# Patient Record
Sex: MALE | HISPANIC OR LATINO | Employment: UNEMPLOYED | URBAN - NONMETROPOLITAN AREA
[De-identification: names, ages, dates, MRNs, and addresses within clinical notes are randomized per-mention and may not be internally consistent; named-entity substitution may affect disease eponyms.]

---

## 2022-10-24 RX ORDER — LISINOPRIL 2.5 MG/1
2.5 TABLET ORAL DAILY
COMMUNITY

## 2022-10-24 RX ORDER — ATORVASTATIN CALCIUM 20 MG/1
20 TABLET, FILM COATED ORAL DAILY
COMMUNITY

## 2025-01-09 ENCOUNTER — CONSULT (OUTPATIENT)
Dept: GASTROENTEROLOGY | Facility: CLINIC | Age: 70
End: 2025-01-09
Payer: COMMERCIAL

## 2025-01-09 VITALS
BODY MASS INDEX: 25.16 KG/M2 | HEART RATE: 62 BPM | SYSTOLIC BLOOD PRESSURE: 118 MMHG | TEMPERATURE: 98.3 F | DIASTOLIC BLOOD PRESSURE: 72 MMHG | HEIGHT: 65 IN | WEIGHT: 151 LBS | OXYGEN SATURATION: 98 %

## 2025-01-09 DIAGNOSIS — K21.9 GASTROESOPHAGEAL REFLUX DISEASE, UNSPECIFIED WHETHER ESOPHAGITIS PRESENT: ICD-10-CM

## 2025-01-09 DIAGNOSIS — K62.89 RECTAL PAIN: ICD-10-CM

## 2025-01-09 DIAGNOSIS — K62.5 RECTAL BLEEDING: Primary | ICD-10-CM

## 2025-01-09 DIAGNOSIS — R10.13 EPIGASTRIC PAIN: ICD-10-CM

## 2025-01-09 DIAGNOSIS — K59.04 CHRONIC IDIOPATHIC CONSTIPATION: ICD-10-CM

## 2025-01-09 DIAGNOSIS — K64.9 HEMORRHOIDS, UNSPECIFIED HEMORRHOID TYPE: ICD-10-CM

## 2025-01-09 PROCEDURE — 99204 OFFICE O/P NEW MOD 45 MIN: CPT | Performed by: NURSE PRACTITIONER

## 2025-01-09 RX ORDER — SODIUM CHLORIDE, SODIUM LACTATE, POTASSIUM CHLORIDE, CALCIUM CHLORIDE 600; 310; 30; 20 MG/100ML; MG/100ML; MG/100ML; MG/100ML
125 INJECTION, SOLUTION INTRAVENOUS CONTINUOUS
OUTPATIENT
Start: 2025-01-09

## 2025-01-09 RX ORDER — POLYETHYLENE GLYCOL 3350, SODIUM SULFATE ANHYDROUS, SODIUM BICARBONATE, SODIUM CHLORIDE, POTASSIUM CHLORIDE 236; 22.74; 6.74; 5.86; 2.97 G/4L; G/4L; G/4L; G/4L; G/4L
4000 POWDER, FOR SOLUTION ORAL ONCE
Qty: 4000 ML | Refills: 0 | Status: SHIPPED | OUTPATIENT
Start: 2025-01-09 | End: 2025-01-09

## 2025-01-09 RX ORDER — HYDROCORTISONE ACETATE 25 MG/1
25 SUPPOSITORY RECTAL 2 TIMES DAILY PRN
Qty: 30 SUPPOSITORY | Refills: 3 | Status: SHIPPED | OUTPATIENT
Start: 2025-01-09

## 2025-01-09 RX ORDER — FAMOTIDINE 20 MG/1
20 TABLET, FILM COATED ORAL 2 TIMES DAILY
Qty: 60 TABLET | Refills: 3 | Status: SHIPPED | OUTPATIENT
Start: 2025-01-09

## 2025-01-09 RX ORDER — POLYETHYLENE GLYCOL 3350 17 G/17G
17 POWDER, FOR SOLUTION ORAL DAILY
Qty: 578 G | Refills: 3 | Status: SHIPPED | OUTPATIENT
Start: 2025-01-09

## 2025-01-09 NOTE — PATIENT INSTRUCTIONS
Proceed with EGD/Colonoscopy.   Can use Anusol suppository up to 2 x daily as needed.   Start Famotidine 20 mg, , 1 pill, 2 x day prior to a meal.   Start Miralax daily.   Keep drinking at least 64 oz of water daily.   Continue to watch for red flag symptoms.   Schedule a f/u OV after EGD/Colonoscopy.     We did review GI red flag symptoms, including, but not limited to: chronic nausea, vomiting, diarrhea, chills, fever, and unintentional weight loss and should call or contact our office with any changes or concerns. I reviewed with the patient that if they notice any blood while vomiting or in their stool they should contact or office or go to the nearest emergency room for immediate evaluation. The patient was agreeable and verbalized an understanding.

## 2025-01-09 NOTE — PROGRESS NOTES
Name: Jenny Tello      : 1955      MRN: [id number]  Encounter Provider: KEI Lucio  Encounter Date: 2025   Encounter department: [facility] [place]  :  Assessment & Plan  Rectal bleeding  While the patient and his daughter were in the office today, I discussed with them that at this point time with his frequent, but intermittent rectal bleeding, pain, hemorrhoids, and history of chronic idiopathic constipation, I do feel that it would be appropriate to proceed with a colonoscopy at this time to evaluate for any other underlying etiology that could explain his symptoms.  The patient was agreeable and verbalized an understanding.    I obtained informed verbal consent from the patient. The risks/benefits/alternatives of the procedure were discussed with the patient. Risks included, but not limited to, infection, bleeding, perforation, injury to organs in the abdomen, missed lesion, and incomplete procedure were discussed. The patient gave verbal understanding and is agreeable to proceed. Bowel prep instructions were reviewed and given as ordered. Patient's questions were answered to the best of my ability and until they verbalized an understanding.      Orders:    Colonoscopy; Future    polyethylene glycol (Golytely) 4000 mL solution; Take 4,000 mL by mouth once for 1 dose Take 4000 mL by mouth once for 1 dose. Use as directed    Rectal pain  To help with the rectal pain and discomfort I did prescribe Anusol suppositories that can be used every 12 hours as needed since the patient feels that some of the discomfort is more internal and he can use the Preparation H externally to help the exterior discomfort.  The patient was agreeable and verbalized an understanding.    Orders:    hydrocortisone (ANUSOL-HC) 25 mg suppository; Insert 1 suppository (25 mg total) into the rectum 2 (two) times a day as needed for hemorrhoids    polyethylene glycol (GLYCOLAX) 17 GM/SCOOP  powder; Take 17 g by mouth daily    Colonoscopy; Future    polyethylene glycol (Golytely) 4000 mL solution; Take 4,000 mL by mouth once for 1 dose Take 4000 mL by mouth once for 1 dose. Use as directed    Chronic idiopathic constipation  While the patient and his daughter in the office today, I also discussed with the patient that I would like like him to start taking MiraLAX, 1 capful daily along with working on making sure he is drinking at least 64 ounces of water a day from now on until his follow-up visit to see if that will help with the chronic idiopathic constipation as well as his rectal pain and hemorrhoids.  The patient and his daughter were agreeable and verbalized understanding.    Orders:    polyethylene glycol (GLYCOLAX) 17 GM/SCOOP powder; Take 17 g by mouth daily    Colonoscopy; Future    polyethylene glycol (Golytely) 4000 mL solution; Take 4,000 mL by mouth once for 1 dose Take 4000 mL by mouth once for 1 dose. Use as directed    Hemorrhoids, unspecified hemorrhoid type  See above.  Orders:    hydrocortisone (ANUSOL-HC) 25 mg suppository; Insert 1 suppository (25 mg total) into the rectum 2 (two) times a day as needed for hemorrhoids    polyethylene glycol (GLYCOLAX) 17 GM/SCOOP powder; Take 17 g by mouth daily    Colonoscopy; Future    polyethylene glycol (Golytely) 4000 mL solution; Take 4,000 mL by mouth once for 1 dose Take 4000 mL by mouth once for 1 dose. Use as directed    Gastroesophageal reflux disease, unspecified whether esophagitis present  I discussed with the patient that with their current symptoms and exam findings, I feel it would be beneficial to proceed with an EGD to further evaluate any underlying etiology that could explain their symptoms, with differential diagnoses that could include but are not limited to; GERD, gastric ulcers, Driscoll's esophagus, EOE, H. pylori, etc. They were agreeable and verbalized and understanding.     I discussed the risks of procedure with the  patient including, but not limited to: bleeding, infection, sore throat, and perforation. The patient gave verbal understanding and is agreeable to proceed. Patient's questions were answered to the best of my ability and until they verbalized an understanding.     In the meantime to try to help some of the GERD symptoms we will start him on famotidine 20 mg twice daily for now and until his follow-up visit after the EGD as at that point time we will reevaluate his medication regimen and treatment plan needs.  The patient and his daughter were agreeable and verbalized understanding.    Encouraged the patient to avoid acidic or trigger foods including alcohol as much as possible.  Encouraged the patient to consider purchasing a wedge pillow to help prevent reflux symptoms while sleeping.  Encouraged the patient not to lay down or sleep for at least 3 to 4 hours after eating and to try to avoid late night snacking.    Orders:    famotidine (PEPCID) 20 mg tablet; Take 1 tablet (20 mg total) by mouth 2 (two) times a day    EGD; Future    Epigastric pain  Orders:    EGD; Future    The patient is to schedule a follow up office visit after his EGD and Colonoscopy, but understands to call or contact our offices with any issues before then or as needed.     Please note that the patient speaks mostly [place] and his daughter who is bilingual interpreted for him the entire time as per his preference.    History of Present Illness   HPI  Jenny Tello is a 69 y.o. male who presents today for his initial consultation and evaluation regarding his recent and continued rectal bleeding, pain, known hemorrhoids, chronic idiopathic constipation, and GERD symptoms.  The patient and his daughter report that he has had blood in his stool, hemorrhoids, and rectal pain for quite some time and recently he had followed up with his primary care provider who ordered a fecal occult blood test which came back positive and has been referred to our  office to discuss proceeding with a colonoscopy.  The patient reports that although he has not had blood in his stool for a few days, he does deal with chronic constipation and deftly notices blood when he has to strain to have a bowel movement which is frequently.  The patient reports he has been using Preparation H and was prescribed Anusol cream, which was somewhat helpful, but he feels that some of the issues that the pain is up further that he patient cannot get the cream applied.    The patient also reports that he has continued and worsening GERD symptoms, especially when he is constipated.    The patient currently denies any dysphagia, vomiting, decreased appetite, unplanned weight loss, or abdominal pain. Water Intake: Adequate daily.     The patient currently reports that they have a BM 1-3 days and reports that it sometimes relieving, without any nocturnal BMs, or melena. Last BM: Yesterday. Flatus: Yes, normal for him.    PMH/PSH:   Abdominal/Chest Surgery: Denied  Colon Cancer: Denied  Any Cancer: Denied  Pre-Cancerous Polyps: Denied  Crohn's: Denied  Ulcerative Colitis: Denied  Driscoll's Esophagus: N/A  Celiac Disease: N/A  Liver Disease: Denied    Tobacco/Vaping: Denied  ETOH: Denied  Marijuana: Denied  Illicit Drug Use: Denied    FH:  Colon Cancer: Denied  Any Cancer: Daughter: Cervical  Family Members with Pre-Cancerous Polyps: Denied  Crohn's: Denied  Ulcerative Colitis: Denied  Celiac Disease: Denied  Liver Disease: Denied    Meds: None  Daily NSAID Use: Denied  Daily Tylenol Use: Denied  Any New Meds: Denied      Imaging: (None)    Endoscopy History: EGD: (None):    COLONOSCOPY: (About 4 years ago): [facility]: 6-7 polyps.    History obtained from: Patient and his daughter.    Review of Systems       Objective   There were no vitals taken for this visit.     Physical Exam  Sclera without icterus and benign. Lung sounds clear to auscultation b/l. Normal S1 & S2 upon exam. Abdomen is soft,  nondistended, with mild to moderate tenderness and upon exam in the epigastric region, without any significant guarding or rebound tenderness noted on exam, with faint bowel sounds x 4.  No edema noted of the b/l lower extremities upon exam today. Skin is non-icteric.     Rectal Exam: Deferred today per patient and his daughter's preference and sine he is having a colonoscopy and it would not change his treatment plan today.

## 2025-01-10 PROBLEM — K62.5 RECTAL BLEEDING: Status: ACTIVE | Noted: 2025-01-10

## 2025-01-10 PROBLEM — K64.9 HEMORRHOIDS: Status: ACTIVE | Noted: 2025-01-10

## 2025-01-10 PROBLEM — K21.9 GASTROESOPHAGEAL REFLUX DISEASE: Status: ACTIVE | Noted: 2025-01-10

## 2025-01-10 PROBLEM — K59.04 CHRONIC IDIOPATHIC CONSTIPATION: Status: ACTIVE | Noted: 2025-01-10

## 2025-01-10 PROBLEM — R10.13 EPIGASTRIC PAIN: Status: ACTIVE | Noted: 2025-01-10

## 2025-01-10 PROBLEM — K62.89 RECTAL PAIN: Status: ACTIVE | Noted: 2025-01-10

## 2025-01-10 NOTE — ASSESSMENT & PLAN NOTE
To help with the rectal pain and discomfort I did prescribe Anusol suppositories that can be used every 12 hours as needed since the patient feels that some of the discomfort is more internal and he can use the Preparation H externally to help the exterior discomfort.  The patient was agreeable and verbalized an understanding.    Orders:    hydrocortisone (ANUSOL-HC) 25 mg suppository; Insert 1 suppository (25 mg total) into the rectum 2 (two) times a day as needed for hemorrhoids    polyethylene glycol (GLYCOLAX) 17 GM/SCOOP powder; Take 17 g by mouth daily    Colonoscopy; Future    polyethylene glycol (Golytely) 4000 mL solution; Take 4,000 mL by mouth once for 1 dose Take 4000 mL by mouth once for 1 dose. Use as directed

## 2025-01-10 NOTE — ASSESSMENT & PLAN NOTE
See above.  Orders:    hydrocortisone (ANUSOL-HC) 25 mg suppository; Insert 1 suppository (25 mg total) into the rectum 2 (two) times a day as needed for hemorrhoids    polyethylene glycol (GLYCOLAX) 17 GM/SCOOP powder; Take 17 g by mouth daily    Colonoscopy; Future    polyethylene glycol (Golytely) 4000 mL solution; Take 4,000 mL by mouth once for 1 dose Take 4000 mL by mouth once for 1 dose. Use as directed

## 2025-01-10 NOTE — ASSESSMENT & PLAN NOTE
While the patient and his daughter in the office today, I also discussed with the patient that I would like like him to start taking MiraLAX, 1 capful daily along with working on making sure he is drinking at least 64 ounces of water a day from now on until his follow-up visit to see if that will help with the chronic idiopathic constipation as well as his rectal pain and hemorrhoids.  The patient and his daughter were agreeable and verbalized understanding.    Orders:    polyethylene glycol (GLYCOLAX) 17 GM/SCOOP powder; Take 17 g by mouth daily    Colonoscopy; Future    polyethylene glycol (Golytely) 4000 mL solution; Take 4,000 mL by mouth once for 1 dose Take 4000 mL by mouth once for 1 dose. Use as directed

## 2025-01-10 NOTE — ASSESSMENT & PLAN NOTE
I discussed with the patient that with their current symptoms and exam findings, I feel it would be beneficial to proceed with an EGD to further evaluate any underlying etiology that could explain their symptoms, with differential diagnoses that could include but are not limited to; GERD, gastric ulcers, Driscoll's esophagus, EOE, H. pylori, etc. They were agreeable and verbalized and understanding.     I discussed the risks of procedure with the patient including, but not limited to: bleeding, infection, sore throat, and perforation. The patient gave verbal understanding and is agreeable to proceed. Patient's questions were answered to the best of my ability and until they verbalized an understanding.     In the meantime to try to help some of the GERD symptoms we will start him on famotidine 20 mg twice daily for now and until his follow-up visit after the EGD as at that point time we will reevaluate his medication regimen and treatment plan needs.  The patient and his daughter were agreeable and verbalized understanding.    Encouraged the patient to avoid acidic or trigger foods including alcohol as much as possible.  Encouraged the patient to consider purchasing a wedge pillow to help prevent reflux symptoms while sleeping.  Encouraged the patient not to lay down or sleep for at least 3 to 4 hours after eating and to try to avoid late night snacking.    Orders:    famotidine (PEPCID) 20 mg tablet; Take 1 tablet (20 mg total) by mouth 2 (two) times a day    EGD; Future

## 2025-01-10 NOTE — ASSESSMENT & PLAN NOTE
While the patient and his daughter were in the office today, I discussed with them that at this point time with his frequent, but intermittent rectal bleeding, pain, hemorrhoids, and history of chronic idiopathic constipation, I do feel that it would be appropriate to proceed with a colonoscopy at this time to evaluate for any other underlying etiology that could explain his symptoms.  The patient was agreeable and verbalized an understanding.    I obtained informed verbal consent from the patient. The risks/benefits/alternatives of the procedure were discussed with the patient. Risks included, but not limited to, infection, bleeding, perforation, injury to organs in the abdomen, missed lesion, and incomplete procedure were discussed. The patient gave verbal understanding and is agreeable to proceed. Bowel prep instructions were reviewed and given as ordered. Patient's questions were answered to the best of my ability and until they verbalized an understanding.      Orders:    Colonoscopy; Future    polyethylene glycol (Golytely) 4000 mL solution; Take 4,000 mL by mouth once for 1 dose Take 4000 mL by mouth once for 1 dose. Use as directed

## 2025-01-14 ENCOUNTER — TELEPHONE (OUTPATIENT)
Dept: GASTROENTEROLOGY | Facility: CLINIC | Age: 70
End: 2025-01-14

## 2025-01-14 NOTE — TELEPHONE ENCOUNTER
Pt's daughter called to see if the suppository and famotidine were sent to [facility], said that when her dad went to the pharmacy all he was given was the powder and the colonoscopy prep  I told her that all were sent on the same day and to double check with the pharmacy, she will call them and then call us back if need be       show